# Patient Record
Sex: FEMALE | Race: WHITE | NOT HISPANIC OR LATINO | Employment: STUDENT | URBAN - METROPOLITAN AREA
[De-identification: names, ages, dates, MRNs, and addresses within clinical notes are randomized per-mention and may not be internally consistent; named-entity substitution may affect disease eponyms.]

---

## 2022-09-27 ENCOUNTER — LAB VISIT (OUTPATIENT)
Dept: LAB | Facility: HOSPITAL | Age: 22
End: 2022-09-27
Attending: INTERNAL MEDICINE
Payer: COMMERCIAL

## 2022-09-27 ENCOUNTER — OFFICE VISIT (OUTPATIENT)
Dept: RHEUMATOLOGY | Facility: CLINIC | Age: 22
End: 2022-09-27
Payer: COMMERCIAL

## 2022-09-27 VITALS
DIASTOLIC BLOOD PRESSURE: 80 MMHG | HEIGHT: 62 IN | WEIGHT: 145.06 LBS | HEART RATE: 82 BPM | SYSTOLIC BLOOD PRESSURE: 124 MMHG | BODY MASS INDEX: 26.69 KG/M2

## 2022-09-27 DIAGNOSIS — M85.88 OSTEOPENIA OF SPINE: ICD-10-CM

## 2022-09-27 DIAGNOSIS — M85.88 OSTEOPENIA OF SPINE: Primary | ICD-10-CM

## 2022-09-27 PROCEDURE — 84144 ASSAY OF PROGESTERONE: CPT | Performed by: INTERNAL MEDICINE

## 2022-09-27 PROCEDURE — 99204 OFFICE O/P NEW MOD 45 MIN: CPT | Mod: S$GLB,,, | Performed by: INTERNAL MEDICINE

## 2022-09-27 PROCEDURE — 82523 COLLAGEN CROSSLINKS: CPT | Performed by: INTERNAL MEDICINE

## 2022-09-27 PROCEDURE — 3079F PR MOST RECENT DIASTOLIC BLOOD PRESSURE 80-89 MM HG: ICD-10-PCS | Mod: CPTII,S$GLB,, | Performed by: INTERNAL MEDICINE

## 2022-09-27 PROCEDURE — 99204 PR OFFICE/OUTPT VISIT, NEW, LEVL IV, 45-59 MIN: ICD-10-PCS | Mod: S$GLB,,, | Performed by: INTERNAL MEDICINE

## 2022-09-27 PROCEDURE — 3008F BODY MASS INDEX DOCD: CPT | Mod: CPTII,S$GLB,, | Performed by: INTERNAL MEDICINE

## 2022-09-27 PROCEDURE — 1159F MED LIST DOCD IN RCRD: CPT | Mod: CPTII,S$GLB,, | Performed by: INTERNAL MEDICINE

## 2022-09-27 PROCEDURE — 3074F SYST BP LT 130 MM HG: CPT | Mod: CPTII,S$GLB,, | Performed by: INTERNAL MEDICINE

## 2022-09-27 PROCEDURE — 99999 PR PBB SHADOW E&M-NEW PATIENT-LVL IV: CPT | Mod: PBBFAC,,, | Performed by: INTERNAL MEDICINE

## 2022-09-27 PROCEDURE — 3079F DIAST BP 80-89 MM HG: CPT | Mod: CPTII,S$GLB,, | Performed by: INTERNAL MEDICINE

## 2022-09-27 PROCEDURE — 3074F PR MOST RECENT SYSTOLIC BLOOD PRESSURE < 130 MM HG: ICD-10-PCS | Mod: CPTII,S$GLB,, | Performed by: INTERNAL MEDICINE

## 2022-09-27 PROCEDURE — 99999 PR PBB SHADOW E&M-NEW PATIENT-LVL IV: ICD-10-PCS | Mod: PBBFAC,,, | Performed by: INTERNAL MEDICINE

## 2022-09-27 PROCEDURE — 3008F PR BODY MASS INDEX (BMI) DOCUMENTED: ICD-10-PCS | Mod: CPTII,S$GLB,, | Performed by: INTERNAL MEDICINE

## 2022-09-27 PROCEDURE — 1160F RVW MEDS BY RX/DR IN RCRD: CPT | Mod: CPTII,S$GLB,, | Performed by: INTERNAL MEDICINE

## 2022-09-27 PROCEDURE — 1160F PR REVIEW ALL MEDS BY PRESCRIBER/CLIN PHARMACIST DOCUMENTED: ICD-10-PCS | Mod: CPTII,S$GLB,, | Performed by: INTERNAL MEDICINE

## 2022-09-27 PROCEDURE — 36415 COLL VENOUS BLD VENIPUNCTURE: CPT | Performed by: INTERNAL MEDICINE

## 2022-09-27 PROCEDURE — 82523 COLLAGEN CROSSLINKS: CPT | Mod: 91 | Performed by: INTERNAL MEDICINE

## 2022-09-27 PROCEDURE — 83735 ASSAY OF MAGNESIUM: CPT | Performed by: INTERNAL MEDICINE

## 2022-09-27 PROCEDURE — 1159F PR MEDICATION LIST DOCUMENTED IN MEDICAL RECORD: ICD-10-PCS | Mod: CPTII,S$GLB,, | Performed by: INTERNAL MEDICINE

## 2022-09-27 PROCEDURE — 82672 ASSAY OF ESTROGEN: CPT | Performed by: INTERNAL MEDICINE

## 2022-09-27 PROCEDURE — 83002 ASSAY OF GONADOTROPIN (LH): CPT | Performed by: INTERNAL MEDICINE

## 2022-09-27 PROCEDURE — 83001 ASSAY OF GONADOTROPIN (FSH): CPT | Performed by: INTERNAL MEDICINE

## 2022-09-27 RX ORDER — FLUOXETINE 20 MG/1
20 TABLET ORAL DAILY
COMMUNITY
Start: 2022-09-08

## 2022-09-27 RX ORDER — DEXTROAMPHETAMINE SULFATE, DEXTROAMPHETAMINE SACCHARATE, AMPHETAMINE ASPARTATE MONOHYDRATE, AND AMPHETAMINE SULFATE 9.375; 9.375; 9.375; 9.375 MG/1; MG/1; MG/1; MG/1
1 CAPSULE, EXTENDED RELEASE ORAL DAILY
COMMUNITY
Start: 2022-08-08

## 2022-09-27 RX ORDER — NORETHINDRONE ACETATE AND ETHINYL ESTRADIOL, ETHINYL ESTRADIOL AND FERROUS FUMARATE 1MG-10(24)
1 KIT ORAL DAILY
COMMUNITY
Start: 2022-06-07

## 2022-09-28 LAB
FSH SERPL-ACNC: 5.23 MIU/ML
LH SERPL-ACNC: 2 MIU/ML
MAGNESIUM SERPL-MCNC: 2 MG/DL (ref 1.6–2.6)
PROGEST SERPL-MCNC: 0.2 NG/ML

## 2022-09-28 NOTE — PROGRESS NOTES
RHEUMATOLOGY CLINIC INITIAL VISIT    Reason for consult:-  Osteopenia in the context multiple fractures.     Chief complaints, HPI, ROS, EXAM, Assessment & Plans:-  Marla Lozoya a 22 y.o. pleasant female comes in with osteopenia. She has longstanding history of multiple cortical bone fractures in the past after significant and mild trauma. Positive history of hypermobility. She had a DEXA done in May which showed evidence of osteopenia at lumbar spine . No history of recurrent kidney stones, use of long term steroids, lactose intolerance, family history of recurrent unexplained bone fractures. History of severe menorrhagia on OCP's for the past 4 years to stop menstrual bleeding. Intermittent prednisone use for sinus infections. Mild low grade mechanical back pain.   No inflammatory back pain or inflammatory small joint symptoms. Rheum ROS negative otherwise.   No synovitis. Mild hypermobility on exam around elbows .   1. Osteopenia of spine      Problem List Items Addressed This Visit       Osteopenia of spine - Primary    Relevant Orders    N-Telopeptide, Serum    C Telopeptide (CTX), Serum    Magnesium    ESTROGENS, TOTAL    PROGESTERONE    FOLLICLE STIMULATING HORMONE    LUTEINIZING HORMONE    DXA Bone Density Spine And Hip       Mild osteopenia noted at lumbar spine ( trabecular bones ) based on T scores. Z score unavailable for review.   Peak bone mass is usually expected around 30 years of age in women especially around trabecular bones.   There is a possibility that idiopathic and iatrogenic hypoestrogenemia might have contributed to her mild lumbar osteopenia.    Would recommend avoiding hormonal contraceptives.   Avoid long term high dose prednisone - ok to use low dose short courses for sinus infection not more than once every 6 months.   Check hormone panel and bone resorption markers.   Repeat DEXA one year from the last result to compare BMD.   No indication for anti resorptive therapies at this  time.     # Follow up in about 8 months (around 5/27/2023).      Past Medical History:   Diagnosis Date    Allergy        Past Surgical History:   Procedure Laterality Date    COSMETIC SURGERY      NASAL SEPTOPLASTY          Social History     Tobacco Use    Smoking status: Never     Passive exposure: Never    Smokeless tobacco: Never   Substance Use Topics    Alcohol use: No     Alcohol/week: 0.0 standard drinks    Drug use: No       Family History   Problem Relation Age of Onset    No Known Problems Mother     Hypertension Father     No Known Problems Sister     No Known Problems Sister        Review of patient's allergies indicates:   Allergen Reactions    Bactrim [sulfamethoxazole-trimethoprim] Rash    Sulfa (sulfonamide antibiotics) Rash       Medication List with Changes/Refills   Current Medications    FLUOXETINE 20 MG TABLET    Take 20 mg by mouth once daily.    LO LOESTRIN FE 1 MG-10 MCG (24)/10 MCG (2) TAB    Take 1 tablet by mouth once daily.    MYDAYIS 37.5 MG CT24    Take 1 capsule by mouth once daily.    VYVANSE 50 MG CAPSULE    50 mg once daily.         Thank you for allowing me to participate in the care Abdias Tyler.    Disclaimer: This note was prepared using voice recognition system and is likely to have sound alike errors and is not proof read.  Please call me with any questions.

## 2022-09-30 LAB — COLLAGEN CTX SERPL-MCNC: 340 PG/ML

## 2022-10-03 LAB
ESTROGEN SERPL-MCNC: 108 PG/ML
NTX TELOPEPTIDE: 17.5 NM BCE

## 2023-05-08 ENCOUNTER — APPOINTMENT (OUTPATIENT)
Dept: RADIOLOGY | Facility: HOSPITAL | Age: 23
End: 2023-05-08
Attending: INTERNAL MEDICINE
Payer: COMMERCIAL

## 2023-05-08 DIAGNOSIS — M85.88 OSTEOPENIA OF SPINE: ICD-10-CM

## 2023-05-08 PROCEDURE — 77080 DEXA BONE DENSITY SPINE HIP: ICD-10-PCS | Mod: 26,,, | Performed by: RADIOLOGY

## 2023-05-08 PROCEDURE — 77080 DXA BONE DENSITY AXIAL: CPT | Mod: TC

## 2023-05-08 PROCEDURE — 77080 DXA BONE DENSITY AXIAL: CPT | Mod: 26,,, | Performed by: RADIOLOGY

## 2023-05-09 ENCOUNTER — PATIENT MESSAGE (OUTPATIENT)
Dept: RHEUMATOLOGY | Facility: CLINIC | Age: 23
End: 2023-05-09
Payer: COMMERCIAL

## 2023-05-09 NOTE — PROGRESS NOTES
Bone density scan is better than previous scan done in 04/2022. T score then was -2.3 . Now it is -2.0.

## 2023-05-23 ENCOUNTER — OFFICE VISIT (OUTPATIENT)
Dept: RHEUMATOLOGY | Facility: CLINIC | Age: 23
End: 2023-05-23
Payer: COMMERCIAL

## 2023-05-23 VITALS
HEART RATE: 80 BPM | BODY MASS INDEX: 29.08 KG/M2 | HEIGHT: 62 IN | SYSTOLIC BLOOD PRESSURE: 116 MMHG | DIASTOLIC BLOOD PRESSURE: 77 MMHG | RESPIRATION RATE: 17 BRPM | WEIGHT: 158.06 LBS

## 2023-05-23 DIAGNOSIS — M85.88 OSTEOPENIA OF SPINE: Primary | ICD-10-CM

## 2023-05-23 PROCEDURE — 99214 OFFICE O/P EST MOD 30 MIN: CPT | Mod: S$GLB,,, | Performed by: INTERNAL MEDICINE

## 2023-05-23 PROCEDURE — 1160F RVW MEDS BY RX/DR IN RCRD: CPT | Mod: CPTII,S$GLB,, | Performed by: INTERNAL MEDICINE

## 2023-05-23 PROCEDURE — 3078F PR MOST RECENT DIASTOLIC BLOOD PRESSURE < 80 MM HG: ICD-10-PCS | Mod: CPTII,S$GLB,, | Performed by: INTERNAL MEDICINE

## 2023-05-23 PROCEDURE — 99999 PR PBB SHADOW E&M-EST. PATIENT-LVL III: ICD-10-PCS | Mod: PBBFAC,,, | Performed by: INTERNAL MEDICINE

## 2023-05-23 PROCEDURE — 3008F BODY MASS INDEX DOCD: CPT | Mod: CPTII,S$GLB,, | Performed by: INTERNAL MEDICINE

## 2023-05-23 PROCEDURE — 3008F PR BODY MASS INDEX (BMI) DOCUMENTED: ICD-10-PCS | Mod: CPTII,S$GLB,, | Performed by: INTERNAL MEDICINE

## 2023-05-23 PROCEDURE — 1160F PR REVIEW ALL MEDS BY PRESCRIBER/CLIN PHARMACIST DOCUMENTED: ICD-10-PCS | Mod: CPTII,S$GLB,, | Performed by: INTERNAL MEDICINE

## 2023-05-23 PROCEDURE — 1159F PR MEDICATION LIST DOCUMENTED IN MEDICAL RECORD: ICD-10-PCS | Mod: CPTII,S$GLB,, | Performed by: INTERNAL MEDICINE

## 2023-05-23 PROCEDURE — 3078F DIAST BP <80 MM HG: CPT | Mod: CPTII,S$GLB,, | Performed by: INTERNAL MEDICINE

## 2023-05-23 PROCEDURE — 99999 PR PBB SHADOW E&M-EST. PATIENT-LVL III: CPT | Mod: PBBFAC,,, | Performed by: INTERNAL MEDICINE

## 2023-05-23 PROCEDURE — 3074F SYST BP LT 130 MM HG: CPT | Mod: CPTII,S$GLB,, | Performed by: INTERNAL MEDICINE

## 2023-05-23 PROCEDURE — 3074F PR MOST RECENT SYSTOLIC BLOOD PRESSURE < 130 MM HG: ICD-10-PCS | Mod: CPTII,S$GLB,, | Performed by: INTERNAL MEDICINE

## 2023-05-23 PROCEDURE — 1159F MED LIST DOCD IN RCRD: CPT | Mod: CPTII,S$GLB,, | Performed by: INTERNAL MEDICINE

## 2023-05-23 PROCEDURE — 99214 PR OFFICE/OUTPT VISIT, EST, LEVL IV, 30-39 MIN: ICD-10-PCS | Mod: S$GLB,,, | Performed by: INTERNAL MEDICINE

## 2023-05-23 RX ORDER — BUPROPION HYDROCHLORIDE 150 MG/1
150 TABLET, EXTENDED RELEASE ORAL DAILY
COMMUNITY

## 2023-05-23 NOTE — PROGRESS NOTES
RHEUMATOLOGY FOLLOW-UP VISIT  Chief complaints, HPI, ROS, EXAM, Assessment & Plans:-  Marla Lozoya a 22 y.o. pleasant female comes in with history of osteopenia. She has longstanding history of multiple cortical bone fractures in the past after significant and mild trauma. Positive history of hypermobility. She had a DEXA done in May 2022 which showed evidence of osteopenia at lumbar spine . No history of recurrent kidney stones, use of long term steroids, lactose intolerance, family history of recurrent unexplained bone fractures. History of severe menorrhagia on OCP's between 2018 and 2022 .  OCP medication.  Last year.  Denies any joint pain today.  Mild mechanical lower back pain.  No inflammatory back pain or inflammatory small joint symptoms. Rheum ROS negative otherwise.   No synovitis.  Significant hypermobility of bilateral elbows and knee joints.  1. Osteopenia of spine      Problem List Items Addressed This Visit       Osteopenia of spine - Primary    Relevant Orders    DXA Bone Density Axial Skeleton 1 or more sites       Personally reviewed the bone density scan.  Shows normal results with-2.0 Z-score at lumbar spine.  Improving.  All workup negative for secondary osteopenia/osteoporosis in the last visit.  Repeat bone density scan in 2 years.  Advised to contact if any hypermobility arthralgia.   # Follow up in about 2 years (around 5/23/2025).      Past Medical History:   Diagnosis Date    Allergy        Past Surgical History:   Procedure Laterality Date    COSMETIC SURGERY      NASAL SEPTOPLASTY          Social History     Tobacco Use    Smoking status: Never     Passive exposure: Never    Smokeless tobacco: Never   Substance Use Topics    Alcohol use: No     Alcohol/week: 0.0 standard drinks    Drug use: No       Family History   Problem Relation Age of Onset    No Known Problems Mother     Hypertension Father     No Known Problems Sister     No Known Problems Sister        Review of patient's  allergies indicates:   Allergen Reactions    Bactrim [sulfamethoxazole-trimethoprim] Rash    Sulfa (sulfonamide antibiotics) Rash       Medication List with Changes/Refills   Current Medications    BUPROPION (WELLBUTRIN SR) 150 MG TBSR 12 HR TABLET    Take 150 mg by mouth once daily.    FLUOXETINE 20 MG TABLET    Take 20 mg by mouth once daily.    LO LOESTRIN FE 1 MG-10 MCG (24)/10 MCG (2) TAB    Take 1 tablet by mouth once daily.    MYDAYIS 37.5 MG CT24    Take 1 capsule by mouth once daily.    VYVANSE 50 MG CAPSULE    50 mg once daily.         Thank you for allowing me to participate in the care ofMarla Tyler.    Disclaimer: This note was prepared using voice recognition system and is likely to have sound alike errors and is not proof read.  Please call me with any questions.

## 2024-05-21 ENCOUNTER — TELEPHONE (OUTPATIENT)
Dept: RHEUMATOLOGY | Facility: CLINIC | Age: 24
End: 2024-05-21
Payer: COMMERCIAL

## 2024-05-21 NOTE — TELEPHONE ENCOUNTER
----- Message from Lexii Nash sent at 5/21/2024  3:40 PM CDT -----  Regarding: concerns  Name of who is calling:   Marla      What is the request in detail: Pt is requesting a call back in ref to medication concerns from dermatologist / if ok to take it  / Spironolactone 50 mgs concerns      Can the clinic reply by [facility]:yes      What number to call back if not [facility]: [phone]

## 2024-05-21 NOTE — TELEPHONE ENCOUNTER
Dermatologist is treating her for acne and prescribed spirolactone.  She read that it can increase estrogen levels and she wanted to make sure she can take it with her history of Osteopenia

## 2024-05-22 ENCOUNTER — PATIENT MESSAGE (OUTPATIENT)
Dept: RHEUMATOLOGY | Facility: CLINIC | Age: 24
End: 2024-05-22
Payer: COMMERCIAL

## 2024-09-05 ENCOUNTER — OFFICE VISIT (OUTPATIENT)
Dept: UROLOGY | Facility: CLINIC | Age: 24
End: 2024-09-05
Payer: COMMERCIAL

## 2024-09-05 VITALS
HEART RATE: 89 BPM | SYSTOLIC BLOOD PRESSURE: 127 MMHG | WEIGHT: 142.63 LBS | BODY MASS INDEX: 28 KG/M2 | HEIGHT: 60 IN | DIASTOLIC BLOOD PRESSURE: 79 MMHG

## 2024-09-05 DIAGNOSIS — N30.00 ACUTE CYSTITIS WITHOUT HEMATURIA: Primary | ICD-10-CM

## 2024-09-05 LAB
BILIRUB UR QL STRIP: NEGATIVE
GLUCOSE UR QL STRIP: POSITIVE
KETONES UR QL STRIP: NEGATIVE
LEUKOCYTE ESTERASE UR QL STRIP: NEGATIVE
PH, POC UA: 5
POC BLOOD, URINE: NEGATIVE
POC NITRATES, URINE: POSITIVE
PROT UR QL STRIP: NEGATIVE
SP GR UR STRIP: 1 (ref 1–1.03)
UROBILINOGEN UR STRIP-ACNC: 1 (ref 0.1–1.1)

## 2024-09-05 PROCEDURE — 99999 PR PBB SHADOW E&M-EST. PATIENT-LVL III: CPT | Mod: PBBFAC,,, | Performed by: UROLOGY

## 2024-09-05 RX ORDER — LEVONORGESTREL 19.5 MG/1
INTRAUTERINE DEVICE INTRAUTERINE
COMMUNITY

## 2024-09-05 RX ORDER — CEFDINIR 300 MG/1
300 CAPSULE ORAL 2 TIMES DAILY
Qty: 20 CAPSULE | Refills: 0 | Status: SHIPPED | OUTPATIENT
Start: 2024-09-05 | End: 2024-09-15

## 2024-09-05 RX ORDER — SPIRONOLACTONE 50 MG/1
1 TABLET, FILM COATED ORAL
COMMUNITY
Start: 2024-05-20

## 2024-09-05 RX ORDER — CIPROFLOXACIN 500 MG/1
500 TABLET ORAL 2 TIMES DAILY
Qty: 30 TABLET | Refills: 2 | Status: SHIPPED | OUTPATIENT
Start: 2024-09-05 | End: 2024-10-20

## 2024-09-05 RX ORDER — VENLAFAXINE HYDROCHLORIDE 150 MG/1
150 CAPSULE, EXTENDED RELEASE ORAL
COMMUNITY
Start: 2024-02-21

## 2024-09-05 NOTE — PROGRESS NOTES
Chief Complaint:   Encounter Diagnosis   Name Primary?    Acute cystitis without hematuria Yes       HPI:  24 year old female who comes in with what appears to be recurrent UTI.  Patient initially diagnosed in July, was treated with Augmentin but no improvement.  Was switched over to Cipro which she took for a few days, began to have leg pain in the converted her to Macrobid.  7 to see a course, unfortunately 3 weeks ago this recurred she was again given another dose of Macrobid for 7-10 days.  She then self refer to urologist, culture appear to be negative no antibiotics were treated.  Patient is still complaining of some dysuria though.  Frequency and urgency do appear to be better, persistent dysuria, she is currently taking D mannose, cranberry and some other probiotics.  No evidence of gross hematuria, remote history of stone when she was 16, no other stone passage.  This was her 1st UTI, she had another 1 about a year ago prior to that none though.  Dysuria is having at least a couple times per day for about 30 minutes after void.  Bladder spasms have resolved but still persistent dysuria.  There could be a correlation.    Allergies:  Bactrim [sulfamethoxazole-trimethoprim] and Sulfa (sulfonamide antibiotics)    Medications:  See MAR    Review of Systems:  General: No fever, chills, fatigability, or weight loss.  Skin: No rashes, itching, or changes in color or texture of skin.  Chest: Denies FENTON, cyanosis, wheezing, cough, and sputum production.  Abdomen: Appetite fine. No weight loss. Denies diarrhea, abdominal pain, hematemesis, or blood in stool.  Musculoskeletal: No joint stiffness or swelling. Denies back pain.  : As above.  All other review of systems negative.    PMH:   has a past medical history of Allergy.    PSH:   has a past surgical history that includes Cosmetic surgery and Nasal septoplasty.    FamHx: family history includes Hypertension in her father; No Known Problems in her mother, sister,  and sister.    SocHx:  reports that she has never smoked. She has never been exposed to tobacco smoke. She has never used smokeless tobacco. She reports that she does not drink alcohol and does not use drugs.      Physical Exam:  Vitals:    09/05/24 1530   BP: 127/79   Pulse: 89     General: A&Ox3, no apparent distress, no deformities  Neck: No masses, normal ROM  Lungs: normal inspiration, no use of accessory muscles  Heart: normal pulse, no arrhythmias  Abdomen: Soft, NT, ND, no masses, no hernias, no hepatosplenomegaly  Skin: The skin is warm and dry. No jaundice.  Ext: No c/c/e.    Labs/Studies:   UA 1+ nitrites, 100 glucose 9/24   Culture E coli 8/24    Impression/Plan:     Recurrent UTI- highly suspicious that these symptoms are due to a recurrent infection, uncertain if the Macrobid and Augmentin were enough to control the infection.  Currently on D mannose, cranberry supplements and probiotics, which are probably assisting.  Will go ahead and treat with cefdinir b.i.d. for 10 mg days.  In addition due to the possibility for correlation will initiate ciprofloxacin self start.  Call with any worsening symptoms, otherwise see me in 1 month and proceed as appropriate from there.

## 2024-10-07 ENCOUNTER — OFFICE VISIT (OUTPATIENT)
Dept: UROLOGY | Facility: CLINIC | Age: 24
End: 2024-10-07
Payer: COMMERCIAL

## 2024-10-07 VITALS
WEIGHT: 140 LBS | DIASTOLIC BLOOD PRESSURE: 72 MMHG | RESPIRATION RATE: 16 BRPM | HEART RATE: 87 BPM | SYSTOLIC BLOOD PRESSURE: 108 MMHG | HEIGHT: 60 IN | BODY MASS INDEX: 27.48 KG/M2 | TEMPERATURE: 98 F

## 2024-10-07 DIAGNOSIS — N30.00 ACUTE CYSTITIS WITHOUT HEMATURIA: Primary | ICD-10-CM

## 2024-10-07 PROCEDURE — 1160F RVW MEDS BY RX/DR IN RCRD: CPT | Mod: CPTII,S$GLB,, | Performed by: UROLOGY

## 2024-10-07 PROCEDURE — 3008F BODY MASS INDEX DOCD: CPT | Mod: CPTII,S$GLB,, | Performed by: UROLOGY

## 2024-10-07 PROCEDURE — 99213 OFFICE O/P EST LOW 20 MIN: CPT | Mod: S$GLB,,, | Performed by: UROLOGY

## 2024-10-07 PROCEDURE — 3078F DIAST BP <80 MM HG: CPT | Mod: CPTII,S$GLB,, | Performed by: UROLOGY

## 2024-10-07 PROCEDURE — 99999 PR PBB SHADOW E&M-EST. PATIENT-LVL IV: CPT | Mod: PBBFAC,,, | Performed by: UROLOGY

## 2024-10-07 PROCEDURE — 1159F MED LIST DOCD IN RCRD: CPT | Mod: CPTII,S$GLB,, | Performed by: UROLOGY

## 2024-10-07 PROCEDURE — 3074F SYST BP LT 130 MM HG: CPT | Mod: CPTII,S$GLB,, | Performed by: UROLOGY

## 2024-10-07 NOTE — PROGRESS NOTES
Chief Complaint:   Encounter Diagnosis   Name Primary?    Acute cystitis without hematuria Yes       HPI:    10/7/24- antibiotics have assisted, no symptoms of UTI.    24 year old female who comes in with what appears to be recurrent UTI.  Patient initially diagnosed in July, was treated with Augmentin but no improvement.  Was switched over to Cipro which she took for a few days, began to have leg pain in the converted her to Macrobid.  7 to see a course, unfortunately 3 weeks ago this recurred she was again given another dose of Macrobid for 7-10 days.  She then self refer to urologist, culture appear to be negative no antibiotics were treated.  Patient is still complaining of some dysuria though.  Frequency and urgency do appear to be better, persistent dysuria, she is currently taking D mannose, cranberry and some other probiotics.  No evidence of gross hematuria, remote history of stone when she was 16, no other stone passage.  This was her 1st UTI, she had another 1 about a year ago prior to that none though.  Dysuria is having at least a couple times per day for about 30 minutes after void.  Bladder spasms have resolved but still persistent dysuria.  There could be a correlation.    Allergies:  Bactrim [sulfamethoxazole-trimethoprim] and Sulfa (sulfonamide antibiotics)    Medications:  See MAR    Review of Systems:  General: No fever, chills, fatigability, or weight loss.  Skin: No rashes, itching, or changes in color or texture of skin.  Chest: Denies FENTON, cyanosis, wheezing, cough, and sputum production.  Abdomen: Appetite fine. No weight loss. Denies diarrhea, abdominal pain, hematemesis, or blood in stool.  Musculoskeletal: No joint stiffness or swelling. Denies back pain.  : As above.  All other review of systems negative.    PMH:   has a past medical history of Allergy.    PSH:   has a past surgical history that includes Cosmetic surgery and Nasal septoplasty.    FamHx: family history includes  Hypertension in her father; No Known Problems in her mother, sister, and sister.    SocHx:  reports that she has never smoked. She has never been exposed to tobacco smoke. She has never used smokeless tobacco. She reports that she does not drink alcohol and does not use drugs.      Physical Exam:  Vitals:    10/07/24 0906   BP: 108/72   Pulse: 87   Resp: 16   Temp: 98 °F (36.7 °C)     General: A&Ox3, no apparent distress, no deformities  Neck: No masses, normal ROM  Lungs: normal inspiration, no use of accessory muscles  Heart: normal pulse, no arrhythmias  Abdomen: Soft, NT, ND, no masses, no hernias, no hepatosplenomegaly  Skin: The skin is warm and dry. No jaundice.  Ext: No c/c/e.    Labs/Studies:   UA 1+ nitrites, 100 glucose 9/24   Culture E coli 8/24    Impression/Plan:      Recurrent UTI- cefdinir worked well, symptoms have now resolved.  This is most likely due to the fact that the Macrobid and Augmentin were not enough to control the infection.  Currently on D mannose, cranberry supplements and probiotics.  We also began ciprofloxacin self start, during certain situations.  Call with any worsening symptoms, otherwise see me in 6 months and if stable yearly from there.

## 2025-07-23 ENCOUNTER — APPOINTMENT (OUTPATIENT)
Dept: RADIOLOGY | Facility: HOSPITAL | Age: 25
End: 2025-07-23
Attending: INTERNAL MEDICINE
Payer: COMMERCIAL

## 2025-07-23 ENCOUNTER — PATIENT MESSAGE (OUTPATIENT)
Dept: RHEUMATOLOGY | Facility: CLINIC | Age: 25
End: 2025-07-23
Payer: COMMERCIAL

## 2025-07-23 DIAGNOSIS — M85.80 OSTEOPENIA, UNSPECIFIED LOCATION: ICD-10-CM

## 2025-07-23 DIAGNOSIS — M85.80 OSTEOPENIA, UNSPECIFIED LOCATION: Primary | ICD-10-CM

## 2025-07-23 PROCEDURE — 77080 DXA BONE DENSITY AXIAL: CPT | Mod: 26,,, | Performed by: RADIOLOGY

## 2025-07-23 PROCEDURE — 77080 DXA BONE DENSITY AXIAL: CPT | Mod: TC
